# Patient Record
Sex: FEMALE | Race: WHITE
[De-identification: names, ages, dates, MRNs, and addresses within clinical notes are randomized per-mention and may not be internally consistent; named-entity substitution may affect disease eponyms.]

---

## 2018-02-01 ENCOUNTER — HOSPITAL ENCOUNTER (EMERGENCY)
Dept: HOSPITAL 17 - NEPK | Age: 28
Discharge: HOME | End: 2018-02-01
Payer: SELF-PAY

## 2018-02-01 VITALS
TEMPERATURE: 98.9 F | HEART RATE: 112 BPM | RESPIRATION RATE: 16 BRPM | OXYGEN SATURATION: 98 % | DIASTOLIC BLOOD PRESSURE: 78 MMHG | SYSTOLIC BLOOD PRESSURE: 154 MMHG

## 2018-02-01 VITALS — WEIGHT: 185.19 LBS | HEIGHT: 65 IN | BODY MASS INDEX: 30.85 KG/M2

## 2018-02-01 DIAGNOSIS — Z88.0: ICD-10-CM

## 2018-02-01 DIAGNOSIS — Z86.19: ICD-10-CM

## 2018-02-01 DIAGNOSIS — F17.200: ICD-10-CM

## 2018-02-01 DIAGNOSIS — A49.01: ICD-10-CM

## 2018-02-01 DIAGNOSIS — L03.115: Primary | ICD-10-CM

## 2018-02-01 DIAGNOSIS — F41.9: ICD-10-CM

## 2018-02-01 DIAGNOSIS — F32.9: ICD-10-CM

## 2018-02-01 PROCEDURE — 99283 EMERGENCY DEPT VISIT LOW MDM: CPT

## 2018-02-01 PROCEDURE — 87205 SMEAR GRAM STAIN: CPT

## 2018-02-01 PROCEDURE — 87186 SC STD MICRODIL/AGAR DIL: CPT

## 2018-02-01 PROCEDURE — 86403 PARTICLE AGGLUT ANTBDY SCRN: CPT

## 2018-02-01 PROCEDURE — 87070 CULTURE OTHR SPECIMN AEROBIC: CPT

## 2018-02-01 NOTE — PD
HPI


Chief Complaint:  Skin Problem


Time Seen by Provider:  20:52


Travel History


International Travel<30 days:  No


Contact w/Intl Traveler<30days:  No


Traveled to known affect area:  No





History of Present Illness


HPI


27-year-old female with history of hepatitis presents for evaluation of a skin 

infection.  She has had recurrent skin infections for the past year in various 

parts of her body.  Her current issue is an area of erythema and drainage to 

the right lateral hip region which started several days ago.  It spontaneously 

burst on its own.  She was seen at an outside emergency room 4 days ago and 

started on clindamycin.  Her symptoms have not improved and this is what 

prompted evaluation today.  She reports pain associated with the lesion, aching

, worse with palpation.  Previous hospital told her to follow-up with 

infectious disease specialist for this recurrent issue.  She has no other 

complaints at this time.





PFSH


Past Medical History


Blood Disorders:  Yes (Hep c)


Anxiety:  Yes


Depression:  Yes


Cancer:  No


Diabetes:  No


Diminished Hearing:  No


Psychiatric:  No


Immunizations Current:  Yes


Seizures:  No


Thyroid Disease:  No


Ulcer:  No


Pregnant?:  Not Pregnant


LMP:  1/12/18





Past Surgical History


Surgical History:  No Previous Surgery





Social History


Alcohol Use:  No


Tobacco Use:  Yes (1 PPD)


Substance Use:  No





Allergies-Medications


(Allergen,Severity, Reaction):  


Coded Allergies:  


     penicillin V (Unverified  Allergy, Severe, 2/1/18)


     penicillin G (Unverified  Allergy, Unknown, 2/1/18)


Reported Meds & Prescriptions





Reported Meds & Active Scripts


Active


Ibuprofen 800 Mg Tab 800 Mg PO Q8 PRN


Reported


Seroquel XR 50 mg (Quetiapine Fumarate) 50 Mg Tab 50 Mg PO HS 








Review of Systems


General / Constitutional:  No: Fever, Chills


Skin:  Positive Other (Positive for skin drainage, pain, redness)





Physical Exam


Narrative


GENERAL: Well-developed well-nourished female no acute distress, anxious


SKIN: Warm and dry.  There is a quarter sized area of ulceration and erythema 

to the lateral right hip region.  There is some yellow purulent drainage.  Old 

scarred lesions are noted in the nearby region of her skin.


MUSCULOSKELETAL: No obvious deformities. No clubbing.  No cyanosis.  No edema. 


NEUROLOGICAL: Awake and alert. No obvious cranial nerve deficits.  Motor 

grossly within normal limits. Normal speech.


PSYCHIATRIC: Appropriate mood and affect; insight and judgment normal.





Data


Data


Last Documented VS





Vital Signs








  Date Time  Temp Pulse Resp B/P (MAP) Pulse Ox O2 Delivery O2 Flow Rate FiO2


 


2/1/18 18:56 98.9 112 16 154/78 (103) 98   








Orders





 Orders


Ed Discharge Order (2/1/18 20:53)


Doxycycline (Vibratab) (2/1/18 21:00)








MDM


Medical Decision Making


Medical Screen Exam Complete:  Yes


Emergency Medical Condition:  Yes


Medical Record Reviewed:  Yes


Differential Diagnosis


Cellulitis, abscess, erysipelas, necrotizing fasciitis


Narrative Course


It appears that the patient has had recurrent skin infections and she currently 

has an active area of cellulitis with some purulent drainage to the lateral 

right hip region.  She is currently on clindamycin and has not seen no 

improvement over the past several days.  Pending culture results the medication 

will be changed to doxycycline.  She is encouraged to follow-up with a 

dermatologist for likely biopsy of the skin lesion to determine the etiology of 

her chronic condition.





Diagnosis





 Primary Impression:  


 Cellulitis





***Additional Instructions:  


Quit taking clindamycin.  Doxycycline as prescribed.  Warm compresses several 

times a day to the affected area.  Follow-up with a dermatologist and return 

for any emergent medical conditions.


***Med/Other Pt SpecificInfo:  Prescription(s) given, Wound Care


Scripts


Doxycycline Hyclate (Doxycycline Hyclate) 100 Mg Cap


100 MG PO BID for Infection, #20 CAP 0 Refills


   Prov: Dave Ashby MD         2/1/18


Disposition:  01 DISCHARGE HOME


Condition:  Stable











Juan Blas Feb 1, 2018 20:58

## 2018-04-04 ENCOUNTER — HOSPITAL ENCOUNTER (EMERGENCY)
Dept: HOSPITAL 17 - NED | Age: 28
Discharge: LEFT BEFORE DISCHARGE | End: 2018-04-04
Payer: SELF-PAY

## 2018-04-04 DIAGNOSIS — Z03.89: Primary | ICD-10-CM

## 2018-04-04 PROCEDURE — 99281 EMR DPT VST MAYX REQ PHY/QHP: CPT
